# Patient Record
Sex: MALE | Race: WHITE | NOT HISPANIC OR LATINO | Employment: OTHER | URBAN - METROPOLITAN AREA
[De-identification: names, ages, dates, MRNs, and addresses within clinical notes are randomized per-mention and may not be internally consistent; named-entity substitution may affect disease eponyms.]

---

## 2017-11-06 ENCOUNTER — ALLSCRIPTS OFFICE VISIT (OUTPATIENT)
Dept: OTHER | Facility: OTHER | Age: 72
End: 2017-11-06

## 2017-11-06 LAB
BILIRUB UR QL STRIP: NORMAL
CLARITY UR: NORMAL
COLOR UR: YELLOW
GLUCOSE (HISTORICAL): NORMAL
HGB UR QL STRIP.AUTO: NORMAL
KETONES UR STRIP-MCNC: NORMAL MG/DL
LEUKOCYTE ESTERASE UR QL STRIP: NORMAL
NITRITE UR QL STRIP: NORMAL
PH UR STRIP.AUTO: 6 [PH]
PROT UR STRIP-MCNC: NORMAL MG/DL
SP GR UR STRIP.AUTO: 1.02
UROBILINOGEN UR QL STRIP.AUTO: 0.2

## 2018-01-14 VITALS
WEIGHT: 268 LBS | SYSTOLIC BLOOD PRESSURE: 126 MMHG | BODY MASS INDEX: 43.07 KG/M2 | DIASTOLIC BLOOD PRESSURE: 72 MMHG | HEIGHT: 66 IN

## 2018-04-24 DIAGNOSIS — N32.81 OVERACTIVE BLADDER: Primary | ICD-10-CM

## 2018-04-24 NOTE — TELEPHONE ENCOUNTER
Patient called requesting refill on Enablex 15mg    Request for same, 90 day supply with 2 refills was queued and forwarded to Dr Guilherme Brown for approval

## 2018-04-25 RX ORDER — DARIFENACIN HYDROBROMIDE 15 MG/1
15 TABLET, EXTENDED RELEASE ORAL DAILY
Qty: 90 TABLET | Refills: 2 | Status: SHIPPED | OUTPATIENT
Start: 2018-04-25 | End: 2019-01-25 | Stop reason: SDUPTHER

## 2018-05-06 DIAGNOSIS — R97.20 ELEVATED PROSTATE SPECIFIC ANTIGEN (PSA): ICD-10-CM

## 2018-05-07 RX ORDER — WARFARIN SODIUM 10 MG/1
TABLET ORAL
COMMUNITY

## 2018-05-07 RX ORDER — NIACIN 500 MG/1
TABLET, EXTENDED RELEASE ORAL
COMMUNITY

## 2018-05-07 RX ORDER — ATORVASTATIN CALCIUM 20 MG/1
20 TABLET, FILM COATED ORAL
COMMUNITY
Start: 2018-01-09

## 2018-05-07 RX ORDER — MULTIVIT-MIN/FOLIC ACID/LUTEIN 500-250MCG
TABLET,CHEWABLE ORAL
COMMUNITY

## 2018-05-07 RX ORDER — PREDNISONE 10 MG/1
TABLET ORAL
COMMUNITY
Start: 2018-04-19

## 2018-05-07 RX ORDER — AMOXICILLIN 500 MG/1
CAPSULE ORAL
COMMUNITY
Start: 2018-04-02

## 2018-05-07 RX ORDER — CALCITRIOL 0.25 UG/1
CAPSULE, LIQUID FILLED ORAL
COMMUNITY
Start: 2018-03-07

## 2018-05-07 RX ORDER — LEVOTHYROXINE SODIUM 175 UG/1
TABLET ORAL
COMMUNITY
Start: 2017-11-27

## 2018-05-09 ENCOUNTER — OFFICE VISIT (OUTPATIENT)
Dept: UROLOGY | Facility: MEDICAL CENTER | Age: 73
End: 2018-05-09
Payer: MEDICARE

## 2018-05-09 VITALS
HEIGHT: 66 IN | SYSTOLIC BLOOD PRESSURE: 132 MMHG | WEIGHT: 275 LBS | DIASTOLIC BLOOD PRESSURE: 70 MMHG | BODY MASS INDEX: 44.2 KG/M2

## 2018-05-09 DIAGNOSIS — N31.1 REFLEX NEUROPATHIC BLADDER, NOT ELSEWHERE CLASSIFIED: ICD-10-CM

## 2018-05-09 DIAGNOSIS — N13.8 BPH WITH OBSTRUCTION/LOWER URINARY TRACT SYMPTOMS: Primary | ICD-10-CM

## 2018-05-09 DIAGNOSIS — N40.1 BPH WITH OBSTRUCTION/LOWER URINARY TRACT SYMPTOMS: Primary | ICD-10-CM

## 2018-05-09 DIAGNOSIS — R97.20 ELEVATED PSA: ICD-10-CM

## 2018-05-09 PROBLEM — I51.9 HEART DISEASE: Status: ACTIVE | Noted: 2017-10-31

## 2018-05-09 PROBLEM — E66.01 MORBID OBESITY (HCC): Status: ACTIVE | Noted: 2017-05-09

## 2018-05-09 LAB
SL AMB  POCT GLUCOSE, UA: NORMAL
SL AMB LEUKOCYTE ESTERASE,UA: NORMAL
SL AMB POCT BILIRUBIN,UA: NORMAL
SL AMB POCT BLOOD,UA: NORMAL
SL AMB POCT CLARITY,UA: CLEAR
SL AMB POCT COLOR,UA: YELLOW
SL AMB POCT KETONES,UA: NORMAL
SL AMB POCT NITRITE,UA: NORMAL
SL AMB POCT PH,UA: 6
SL AMB POCT SPECIFIC GRAVITY,UA: 1.02
SL AMB POCT URINE PROTEIN: NORMAL
SL AMB POCT UROBILINOGEN: 0.2

## 2018-05-09 PROCEDURE — 99214 OFFICE O/P EST MOD 30 MIN: CPT | Performed by: UROLOGY

## 2018-05-09 PROCEDURE — 81003 URINALYSIS AUTO W/O SCOPE: CPT | Performed by: UROLOGY

## 2018-05-09 NOTE — PROGRESS NOTES
Assessment/Plan:    Elevated PSA  PSA is stable at 4 69 (March 28, 2018) he had a biopsy in the past     Reflex neuropathic bladder, not elsewhere classified  Stable on Enablex 15 mg daily  Renal ultrasound did not reveal any mass or hydronephrosis last year  Continue present therapy  BPH with obstruction/lower urinary tract symptoms  AUA symptom score is 23  Urinalysis is negative  He is satisfied with his voiding pattern on Enablex  We will continue to follow  Diagnoses and all orders for this visit:    BPH with obstruction/lower urinary tract symptoms  -     POCT urine dip auto non-scope    Elevated PSA  -     PSA, total and free; Future    Reflex neuropathic bladder, not elsewhere classified    Other orders  -     amoxicillin (AMOXIL) 500 mg capsule; 4 cap 1 hr before dental procedure  -     aspirin 81 MG tablet; Take by mouth  -     atorvastatin (LIPITOR) 20 mg tablet; Take 20 mg by mouth  -     calcitriol (ROCALTROL) 0 25 mcg capsule; 1 tab 2 x aday  -     Calcium Carbonate-Vit D-Min (CALCIUM 600+D PLUS MINERALS) 600-400 MG-UNIT CHEW; Chew  -     warfarin (COUMADIN) 10 mg tablet; Take by mouth  -     Benzocaine-Docusate Sodium (ENEMEEZ PLUS)  MG ENEM; INSERT 1 RECTALLY DAILY  -     levothyroxine 175 mcg tablet; Take 1 tab by mouth 6days per week  -     Magnesium Oxide -Mg Supplement 400 MG CAPS; Take by mouth  -     metoprolol tartrate (LOPRESSOR) 25 mg tablet; Take 25 mg by mouth  -     niacin (NIASPAN) 500 mg CR tablet; Take by mouth  -     OMEGA-3 FATTY ACIDS PO; Take by mouth  -     predniSONE 10 mg tablet; Take 1 tablet 3 times daily for 5 days then 1 tabletTwice daily for 5 days then 1 tablet daily for 5 days          Subjective:      Patient ID: Suzanne Arroyo is a 67 y o  male  60-year-old male followed for lower tract symptoms secondary to BPH and a neurogenic bladder as well as an elevated PSA  He reports he is voiding adequately  His stream is slow and intermittent    He does feel he empties  He has occasional urgency and urge incontinence  He continues to take Enablex and feels the medication definitely does help  He get he gets up once a night to urinate  There is no gross hematuria, dysuria or symptoms of infection  He has no flank pain  There is no history of urinary tract infection  The following portions of the patient's history were reviewed and updated as appropriate: allergies, current medications, past family history, past medical history, past social history, past surgical history and problem list     Review of Systems   Constitutional: Negative for chills, diaphoresis, fatigue and fever  HENT: Negative  Eyes: Negative  Respiratory: Negative  Cardiovascular: Negative  Gastrointestinal: Negative  Endocrine: Negative  Musculoskeletal: Positive for gait problem  Skin: Negative  Allergic/Immunologic: Negative  Neurological: Positive for numbness  Hematological: Negative  Psychiatric/Behavioral: Negative  Objective:      /70 (BP Location: Left arm, Patient Position: Sitting)   Ht 5' 6" (1 676 m)   Wt 125 kg (275 lb)   BMI 44 39 kg/m²          Physical Exam   Constitutional: He is oriented to person, place, and time  He appears well-developed and well-nourished  HENT:   Head: Normocephalic and atraumatic  Eyes: Conjunctivae are normal    Neck: Neck supple  Cardiovascular: Normal rate  Pulmonary/Chest: Effort normal    Abdominal: Soft  Bowel sounds are normal  He exhibits no distension and no mass  There is no tenderness  There is no rebound, no guarding and no CVA tenderness  Hernia confirmed negative in the right inguinal area and confirmed negative in the left inguinal area  Genitourinary: Rectum normal, testes normal and penis normal  Prostate is enlarged  Prostate is not tender  Right testis shows no mass  Left testis shows no mass  No phimosis or hypospadias     Genitourinary Comments: Decreased tone  Prostate 1 1/2+ and palpably benign  Musculoskeletal: He exhibits no edema  Neurological: He is alert and oriented to person, place, and time  Skin: Skin is warm and dry  Psychiatric: He has a normal mood and affect   His behavior is normal  Judgment and thought content normal

## 2018-05-09 NOTE — PATIENT INSTRUCTIONS
Prostate Specific Antigen   AMBULATORY CARE:   A prostate specific antigen (PSA) test  is a blood test used to screen men for prostate cancer  A PSA test is also used to monitor how well prostate cancer treatment is working  Other test that may be done with a PSA test:  A digital rectal exam is usually performed with a PSA test  Your healthcare provider will insert a gloved finger into your rectum to feel if your prostate is large, firm, or has lumps  Who may need a PSA test:  Some experts recommend a PSA test for men ages 48 to 79  They also recommend testing men with a high risk for prostate cancer at age 36 or 39  Risk factors may include being  or having a brother or father with prostate cancer  Other experts may not recommend PSA testing  Your healthcare provider can help you decide if you need a PSA test    What the results of a PSA test mean:  Most healthy men have a PSA level less than 4 ng/mL  If your PSA level is higher than 4 ng/mL you may need more tests  Examples include blood or urine tests, an ultrasound, MRI, CT scan, or a prostate biopsy  Ask your healthcare provider for more information on these tests  Other causes of a high PSA level:  A high PSA level does not always mean you have prostate cancer  Certain conditions or procedures can increase PSA levels  Examples include:  · Older age    · Procedures such as a prostate biopsy or a cystoscopy    · An enlarged prostate    · Recent sexual activity    · A prostate infection    · Certain exercises that put pressure on the prostate such as bicycling    · Medicine such as testosterone  © 2017 [address] Information is for End User's use only and may not be sold, redistributed or otherwise used for commercial purposes  All illustrations and images included in CareNotes® are the copyrighted property of [employer]  or [employer] D[employer]  The above information is an  only   It is not intended as medical advice for individual conditions or treatments  Talk to your doctor, nurse or pharmacist before following any medical regimen to see if it is safe and effective for you

## 2018-05-09 NOTE — PROGRESS NOTES
IPSS Questionnaire (AUA-7): Over the past month    1)  How often have you had a sensation of not emptying your bladder completely after you finish urinating? 1 - Less than 1 time in 5   2)  How often have you had to urinate again less than two hours after you finished urinating? 2 - Less than half the time   3)  How often have you found you stopped and started again several times when you urinated? 5 - Almost always   4) How difficult have you found it to postpone urination? 4 - More than half the time   5) How often have you had a weak urinary stream?  5 - Almost always   6) How often have you had to push or strain to begin urination? 5 - Almost always   7) How many times did you most typically get up to urinate from the time you went to bed until the time you got up in the morning?   1 - 1 time   Total Score:  23

## 2018-05-09 NOTE — ASSESSMENT & PLAN NOTE
AUA symptom score is 23  Urinalysis is negative  He is satisfied with his voiding pattern on Enablex  We will continue to follow

## 2018-05-09 NOTE — ASSESSMENT & PLAN NOTE
Stable on Enablex 15 mg daily  Renal ultrasound did not reveal any mass or hydronephrosis last year  Continue present therapy

## 2018-05-09 NOTE — LETTER
May 9, 2018     Owen Duran, [address] [address]    Patient: Johann Terrazas   YOB: 1945   Date of Visit: 5/9/2018       Dear Dr Ray Quintana: Thank you for referring Jerilyn Perla to me for evaluation  Below are my notes for this consultation  If you have questions, please do not hesitate to call me  I look forward to following your patient along with you  Sincerely,        Chris Vyas MD        CC: No Recipients  Chris Vyas MD  5/9/2018  1:33 PM  Incomplete  Assessment/Plan:    No problem-specific Assessment & Plan notes found for this encounter  Diagnoses and all orders for this visit:    BPH with obstruction/lower urinary tract symptoms  -     POCT urine dip auto non-scope    Elevated PSA    Other orders  -     amoxicillin (AMOXIL) 500 mg capsule; 4 cap 1 hr before dental procedure  -     aspirin 81 MG tablet; Take by mouth  -     atorvastatin (LIPITOR) 20 mg tablet; Take 20 mg by mouth  -     calcitriol (ROCALTROL) 0 25 mcg capsule; 1 tab 2 x aday  -     Calcium Carbonate-Vit D-Min (CALCIUM 600+D PLUS MINERALS) 600-400 MG-UNIT CHEW; Chew  -     warfarin (COUMADIN) 10 mg tablet; Take by mouth  -     Benzocaine-Docusate Sodium (ENEMEEZ PLUS)  MG ENEM; INSERT 1 RECTALLY DAILY  -     levothyroxine 175 mcg tablet; Take 1 tab by mouth 6days per week  -     Magnesium Oxide -Mg Supplement 400 MG CAPS; Take by mouth  -     metoprolol tartrate (LOPRESSOR) 25 mg tablet; Take 25 mg by mouth  -     niacin (NIASPAN) 500 mg CR tablet; Take by mouth  -     OMEGA-3 FATTY ACIDS PO; Take by mouth  -     predniSONE 10 mg tablet; Take 1 tablet 3 times daily for 5 days then 1 tabletTwice daily for 5 days then 1 tablet daily for 5 days          Subjective:      Patient ID: Johann Terrazas is a 67 y o  male      HPI    The following portions of the patient's history were reviewed and updated as appropriate: allergies, current medications, past family history, past medical history, past social history, past surgical history and problem list     Review of Systems   Constitutional: Negative for chills, diaphoresis, fatigue and fever  HENT: Negative  Eyes: Negative  Respiratory: Negative  Cardiovascular: Negative  Gastrointestinal: Negative  Endocrine: Negative  Musculoskeletal: Positive for gait problem  Skin: Negative  Allergic/Immunologic: Negative  Neurological: Positive for numbness  Hematological: Negative  Psychiatric/Behavioral: Negative  Objective:      /70 (BP Location: Left arm, Patient Position: Sitting)   Ht 5' 6" (1 676 m)   Wt 125 kg (275 lb)   BMI 44 39 kg/m²           Physical Exam   Constitutional: He is oriented to person, place, and time  He appears well-developed and well-nourished  HENT:   Head: Normocephalic and atraumatic  Eyes: Conjunctivae are normal    Neck: Neck supple  Cardiovascular: Normal rate  Pulmonary/Chest: Effort normal    Abdominal: Soft  Bowel sounds are normal  He exhibits no distension and no mass  There is no tenderness  There is no rebound, no guarding and no CVA tenderness  Hernia confirmed negative in the right inguinal area and confirmed negative in the left inguinal area  Genitourinary: Rectum normal, testes normal and penis normal  Prostate is enlarged  Prostate is not tender  Right testis shows no mass  Left testis shows no mass  No phimosis or hypospadias  Genitourinary Comments: Decreased tone  Prostate 1 1/2+ and palpably benign  Musculoskeletal: He exhibits no edema  Neurological: He is alert and oriented to person, place, and time  Skin: Skin is warm and dry  Psychiatric: He has a normal mood and affect   His behavior is normal  Judgment and thought content normal

## 2019-01-23 DIAGNOSIS — N32.81 OVERACTIVE BLADDER: ICD-10-CM

## 2019-01-23 NOTE — TELEPHONE ENCOUNTER
Patient left message for a refill on medication Darifenacin 15 mg called into his [address] in [place] on [address]  Patient is looking for a 90 day supply   Pharmacy number [phone]

## 2019-01-25 RX ORDER — DARIFENACIN HYDROBROMIDE 15 MG/1
15 TABLET, EXTENDED RELEASE ORAL DAILY
Qty: 90 TABLET | Refills: 2 | Status: SHIPPED | OUTPATIENT
Start: 2019-01-25

## 2019-01-25 NOTE — TELEPHONE ENCOUNTER
Patient called requesting refill on Enablex 15mg    Request for same, 90 day supply with 2 refills was queued and forwarded to JADE Hernandez for approval

## 2019-06-05 ENCOUNTER — TELEPHONE (OUTPATIENT)
Dept: UROLOGY | Facility: MEDICAL CENTER | Age: 74
End: 2019-06-05

## 2019-06-05 DIAGNOSIS — R97.20 ELEVATED PROSTATE SPECIFIC ANTIGEN (PSA): Primary | ICD-10-CM

## 2019-06-18 ENCOUNTER — OFFICE VISIT (OUTPATIENT)
Dept: UROLOGY | Facility: MEDICAL CENTER | Age: 74
End: 2019-06-18
Payer: MEDICARE

## 2019-06-18 VITALS
WEIGHT: 268 LBS | SYSTOLIC BLOOD PRESSURE: 130 MMHG | HEIGHT: 65 IN | HEART RATE: 66 BPM | BODY MASS INDEX: 44.65 KG/M2 | DIASTOLIC BLOOD PRESSURE: 78 MMHG

## 2019-06-18 DIAGNOSIS — N40.1 BPH WITH OBSTRUCTION/LOWER URINARY TRACT SYMPTOMS: ICD-10-CM

## 2019-06-18 DIAGNOSIS — N31.1 REFLEX NEUROPATHIC BLADDER, NOT ELSEWHERE CLASSIFIED: ICD-10-CM

## 2019-06-18 DIAGNOSIS — R97.20 ELEVATED PROSTATE SPECIFIC ANTIGEN (PSA): Primary | ICD-10-CM

## 2019-06-18 DIAGNOSIS — N13.8 BPH WITH OBSTRUCTION/LOWER URINARY TRACT SYMPTOMS: ICD-10-CM

## 2019-06-18 LAB
SL AMB  POCT GLUCOSE, UA: ABNORMAL
SL AMB LEUKOCYTE ESTERASE,UA: ABNORMAL
SL AMB POCT BILIRUBIN,UA: ABNORMAL
SL AMB POCT BLOOD,UA: ABNORMAL
SL AMB POCT CLARITY,UA: CLEAR
SL AMB POCT COLOR,UA: YELLOW
SL AMB POCT KETONES,UA: ABNORMAL
SL AMB POCT NITRITE,UA: ABNORMAL
SL AMB POCT PH,UA: 6
SL AMB POCT SPECIFIC GRAVITY,UA: 1.02
SL AMB POCT URINE PROTEIN: ABNORMAL
SL AMB POCT UROBILINOGEN: 0.2

## 2019-06-18 PROCEDURE — 81003 URINALYSIS AUTO W/O SCOPE: CPT | Performed by: UROLOGY

## 2019-06-18 PROCEDURE — 99214 OFFICE O/P EST MOD 30 MIN: CPT | Performed by: UROLOGY

## 2019-06-18 RX ORDER — SENNA PLUS 8.6 MG/1
TABLET ORAL
COMMUNITY
Start: 2011-12-15

## 2019-06-18 RX ORDER — METFORMIN HYDROCHLORIDE 500 MG/1
500 TABLET, EXTENDED RELEASE ORAL
COMMUNITY
Start: 2019-05-28 | End: 2020-05-27

## 2019-07-15 ENCOUNTER — HOSPITAL ENCOUNTER (OUTPATIENT)
Dept: RADIOLOGY | Facility: HOSPITAL | Age: 74
Discharge: HOME | End: 2019-07-15
Attending: UROLOGY
Payer: MEDICARE

## 2019-07-15 ENCOUNTER — TRANSCRIBE ORDERS (OUTPATIENT)
Dept: RADIOLOGY | Facility: HOSPITAL | Age: 74
End: 2019-07-15

## 2019-07-15 DIAGNOSIS — R97.20 ELEVATED PROSTATE SPECIFIC ANTIGEN (PSA): ICD-10-CM

## 2019-07-15 PROCEDURE — 70030 X-RAY EYE FOR FOREIGN BODY: CPT

## 2019-07-15 PROCEDURE — 72195 MRI PELVIS W/O DYE: CPT

## 2019-07-23 ENCOUNTER — TELEPHONE (OUTPATIENT)
Dept: UROLOGY | Facility: AMBULATORY SURGERY CENTER | Age: 74
End: 2019-07-23

## 2019-07-24 ENCOUNTER — TELEPHONE (OUTPATIENT)
Dept: UROLOGY | Facility: MEDICAL CENTER | Age: 74
End: 2019-07-24

## 2019-07-24 NOTE — TELEPHONE ENCOUNTER
----- Message from Ron Silveira MD sent at 7/23/2019  3:50 PM EDT -----  Please ask the patient to make an appointment with me or the [facility] to discuss his mri

## 2019-07-29 ENCOUNTER — OFFICE VISIT (OUTPATIENT)
Dept: UROLOGY | Facility: MEDICAL CENTER | Age: 74
End: 2019-07-29
Payer: MEDICARE

## 2019-07-29 VITALS
HEART RATE: 66 BPM | DIASTOLIC BLOOD PRESSURE: 70 MMHG | WEIGHT: 268 LBS | BODY MASS INDEX: 44.65 KG/M2 | SYSTOLIC BLOOD PRESSURE: 124 MMHG | HEIGHT: 65 IN

## 2019-07-29 DIAGNOSIS — N40.1 BPH WITH OBSTRUCTION/LOWER URINARY TRACT SYMPTOMS: Primary | ICD-10-CM

## 2019-07-29 DIAGNOSIS — N13.8 BPH WITH OBSTRUCTION/LOWER URINARY TRACT SYMPTOMS: Primary | ICD-10-CM

## 2019-07-29 DIAGNOSIS — R97.20 ELEVATED PROSTATE SPECIFIC ANTIGEN (PSA): ICD-10-CM

## 2019-07-29 PROCEDURE — 99214 OFFICE O/P EST MOD 30 MIN: CPT | Performed by: UROLOGY

## 2019-07-29 NOTE — PATIENT INSTRUCTIONS
Prostate Biopsy   WHAT YOU NEED TO KNOW:   What do I need to know about a prostate biopsy? A prostate biopsy is a procedure to remove samples of tissue from your prostate gland  The prostate is a male sex gland that makes fluid found in semen  It is located just below the bladder  After the samples are removed, they are sent to a lab and tested for cancer  How do I prepare for a prostate biopsy? · Your healthcare provider will talk to you about how to prepare for this procedure  He may tell you not to eat or drink anything after midnight on the day of your surgery  You will need to stop taking blood thinners several days before your procedure  Examples of blood thinners include warfarin and NSAIDs  You may also need to stop taking herbal supplements before your procedure  Your healthcare provider will tell you what other medicines to take or not take on the day of your procedure  · You will be given an antibiotic to help prevent a bacterial infection  You may need to give yourself an enema (liquid medicine put in your rectum) to help empty your bowel before your procedure  What will happen during a prostate biopsy? · You may need to lie on your side with your knees pulled up toward your chest  Numbing cream or gel may be put into your rectum, or numbing medicine may be injected near your prostate  You may instead be given general anesthesia to keep you asleep and free from pain during the procedure  A biopsy sample may be taken through your rectum, urethra, or perineum  The perineum is the area between your scrotum and rectum  Most of the time, biopsy samples are taken through the rectum  · If your healthcare provider takes a sample through your rectum, he will insert a small ultrasound probe into your rectum  The ultrasound shows pictures of your prostate on a monitor, and is used to help guide the biopsy needle   Your healthcare provider will push the biopsy needle through the wall of your rectum and into your prostate gland  Your healthcare provider will remove between 6 to 12 samples of tissue from different areas of your prostate gland  The samples will be sent to a lab and tested for cancer  What will happen after a prostate biopsy? You may feel soreness at the biopsy site  You may need to take antibiotics for up to 2 days after your procedure to help prevent an infection  You may have some bleeding from your rectum  You may also have blood in your urine, bowel movements, or semen  What are the risks of a prostate biopsy? You may bleed more than expected or get an infection in your urinary tract or prostate gland  The infection may spread to your blood and the rest of your body  Your bladder may not empty completely when you urinate  You may need a catheter to help empty your bladder for a short period of time  Cancer cells may be missed during your biopsy procedure  You may need another prostate biopsy to check for cancer again  CARE AGREEMENT:   You have the right to help plan your care  Learn about your health condition and how it may be treated  Discuss treatment options with your caregivers to decide what care you want to receive  You always have the right to refuse treatment  The above information is an  only  It is not intended as medical advice for individual conditions or treatments  Talk to your doctor, nurse or pharmacist before following any medical regimen to see if it is safe and effective for you  © 2017 [facility] Information is for End User's use only and may not be sold, redistributed or otherwise used for commercial purposes  All illustrations and images included in CareNotes® are the copyrighted property of [employer]  or Randolph Dao

## 2019-07-29 NOTE — PROGRESS NOTES
Chief complaint:  Elevated PSA    HPI:  22-year-old male followed for an elevated PSA  Most recent PSA has risen to 7 03  Free PSA was 17%  The patient has had a prior negative biopsy  At his last visit we discussed the pros and cons of proceeding with further evaluation and he elected to have a prostate MRI  The MRI reveals a 70 g prostate  There is a 0 8 cm PIRADS 4 lesion in the left posterior peripheral zone  The patient returns today for follow-up and to discuss the findings on MRI  He reports that he had an episode of chest discomfort during the MRI  The patient felt that this was musculoskeletal and it did resolve after approximately 48 hours  The patient is presently feeling well  He has not had any change in his voiding pattern  There is no gross hematuria or symptoms of infection  The patient reports that he has been noted to have blood in his stool and he will be seeing a gastroenterologist next week  He remains on Coumadin  I had a long discussion with the patient regarding the PIRADS for lesion on MRI  The risk of prostate cancer was discussed  The patient has multiple competing comorbidities  He was interested in pursuing fusion prostate biopsy of the lesion  The procedure was described and risks discussed  Prior to proceeding with biopsy  I did recommend he discuss the risk of discontinuing his Coumadin with his cardiologist   I also felt he should inform the cardiologist of his chest discomfort even though the patient feels this was musculoskeletal or related to the gadolinium infused during the MRI  At the conclusion were discussion the patient did wish to proceed with biopsy  We will plan to schedule this after he sees gastroenterology  He will also contact the heart doctor  25 minutes was spent in discussion regarding the above

## 2019-07-29 NOTE — LETTER
July 29, 2019     Anita Barrios, [address] [address] [address] [facility] [address]    Patient: Viktor Kelley   YOB: 1945   Date of Visit: 7/29/2019       Dear Dr Ivan Llanos: Thank you for referring Latosha Encarnacion to me for evaluation  Below are my notes for this consultation  If you have questions, please do not hesitate to call me  I look forward to following your patient along with you  Sincerely,        Neisha Meng MD        CC: DO Neisha Lugo MD  7/29/2019 10:14 AM  Sign at close encounter  Chief complaint:  Elevated PSA    HPI:  29-year-old male followed for an elevated PSA  Most recent PSA has risen to 7 03  Free PSA was 17%  The patient has had a prior negative biopsy  At his last visit we discussed the pros and cons of proceeding with further evaluation and he elected to have a prostate MRI  The MRI reveals a 70 g prostate  There is a 0 8 cm PIRADS 4 lesion in the left posterior peripheral zone  The patient returns today for follow-up and to discuss the findings on MRI  He reports that he had an episode of chest discomfort during the MRI  The patient felt that this was musculoskeletal and it did resolve after approximately 48 hours  The patient is presently feeling well  He has not had any change in his voiding pattern  There is no gross hematuria or symptoms of infection  The patient reports that he has been noted to have blood in his stool and he will be seeing a gastroenterologist next week  He remains on Coumadin  I had a long discussion with the patient regarding the PIRADS for lesion on MRI  The risk of prostate cancer was discussed  The patient has multiple competing comorbidities  He was interested in pursuing fusion prostate biopsy of the lesion  The procedure was described and risks discussed    Prior to proceeding with biopsy  I did recommend he discuss the risk of discontinuing his Coumadin with his cardiologist   I also felt he should inform the cardiologist of his chest discomfort even though the patient feels this was musculoskeletal or related to the gadolinium infused during the MRI  At the conclusion were discussion the patient did wish to proceed with biopsy  We will plan to schedule this after he sees gastroenterology  He will also contact the heart doctor  25 minutes was spent in discussion regarding the above

## 2019-08-06 ENCOUNTER — TELEPHONE (OUTPATIENT)
Dept: UROLOGY | Facility: MEDICAL CENTER | Age: 74
End: 2019-08-06

## 2019-08-06 NOTE — TELEPHONE ENCOUNTER
I spoke to patient and scheduled his MRI Fusion Prostate Biopsy  Please send in cipro and sedation  medication to local pharmacy

## 2019-08-08 DIAGNOSIS — R97.20 ELEVATED PROSTATE SPECIFIC ANTIGEN (PSA): Primary | ICD-10-CM

## 2019-08-08 RX ORDER — CIPROFLOXACIN 500 MG/1
500 TABLET, FILM COATED ORAL EVERY 12 HOURS SCHEDULED
Qty: 2 TABLET | Refills: 0 | Status: SHIPPED | OUTPATIENT
Start: 2019-08-08 | End: 2019-08-09

## 2019-08-08 RX ORDER — LORAZEPAM 1 MG/1
TABLET ORAL
Qty: 1 TABLET | Refills: 0 | Status: SHIPPED | OUTPATIENT
Start: 2019-08-08

## 2019-08-14 NOTE — TELEPHONE ENCOUNTER
Patient calling to discuss biopsy  Has questions regarding schedule and medication      He can be reached at [phone]

## 2019-08-14 NOTE — TELEPHONE ENCOUNTER
Spoke to pt  He is scheduled for Fusion Biopsy on 10/15  States he is trying to coordinate this procedure with colonoscopy  Pt is on Coumadin and needs to be bridged  States Dr Darcy Ball was to speak with his Cardiologist Dr Justin Pena  Will direct to Dr Darcy Ball and Surgery Scheduler re: issue

## 2019-08-19 NOTE — TELEPHONE ENCOUNTER
I spoke to Dr Mustapha Haddad office  They said that since Dr Deedee Helton prescribed the Coumadin the patient would have to talk to Dr Deedee Helton about whether the medication could be held or if he should have a Lovenox bridge  Please ask Mr  Current to call Dr Deedee Helton would then be the physician to order any needed medication

## 2019-10-15 ENCOUNTER — TELEPHONE (OUTPATIENT)
Dept: UROLOGY | Facility: MEDICAL CENTER | Age: 74
End: 2019-10-15

## 2019-10-15 NOTE — TELEPHONE ENCOUNTER
Received signed authorization from the patient dated ? 10/11/2019 to release information to [address] [place], [place] [address]y   Per patient request, Medical Records were faxed to [address] [place] [address] Urology  on 10/15/2019

## 2019-10-29 NOTE — TELEPHONE ENCOUNTER
Final result in Epic    Please review MA/nurse's notes reviewed.    Subjective:  Patient is here today for cold symptoms that started yesterday morning.  He has been having nasal congestion and rhinorrhea, itchy nose, itchy watery eyes, burning sensation of the nose.  He states that he is coughing.  He has a history of asthma but is not noticing any symptoms shortness of breath or wheezing.  He uses Advair Diskus twice daily and albuterol just as needed.  He has not felt like he needed his albuterol recently.  He has Singulair 10 mg a day uses nightly.  He has allergic rhinitis and uses fluticasone nasal spray 2 sprays each nostril twice daily.  His current prescriptions says to use this once daily but he states that he is using it twice a day.  He does not use other antihistamines.  He states that he was having a difficult time sleeping last night because he could not use his CPAP mask because of his congestion.  He states that is rhinorrhea is purulent.  Eyes are itchy, watery, and red.  He uses Visine but this does not help his symptoms much.  He denies fevers or chills.  He states have been other family members in the house who are currently ill.  Patient is diabetic and has hypertension.  He is requesting a new freestyle meter because he had accidentally stepped on his and broke.    Current Outpatient Medications   Medication Sig Dispense Refill   • hydrochlorothiazide (HYDRODIURIL) 25 MG tablet Take 1 tablet by mouth daily. 30 tablet 5   • atorvastatin (LIPITOR) 20 MG tablet Take 1 tablet by mouth daily. 60 tablet 5   • [START ON 11/26/2019] HYDROcodone-acetaminophen (NORCO)  MG per tablet Take 1 tablet by mouth every 4 hours as needed for Pain. Do not start before November 26, 2019. Max 5 tabs daily 150 tablet 0   • HYDROcodone-acetaminophen (NORCO)  MG per tablet Take 1 tablet by mouth every 4 to 6 hours as needed for Pain. Do not start before October 27, 2019. Max 5 daily 150 tablet 0   • metFORMIN (GLUCOPHAGE) 500 MG tablet TAKE 1  TABLET BY MOUTH TWICE DAILY WITH MEALS 60 tablet 2   • lisinopril (ZESTRIL) 20 MG tablet Take 1 tablet by mouth 2 times daily. 60 tablet 5   • ADVAIR DISKUS 250-50 MCG/DOSE inhaler INHALE 1 PUFF INTO THE LUNGS TWICE DAILY 60 each 11   • DULoxetine (CYMBALTA) 60 MG capsule Take 1 capsule by mouth daily. 30 capsule 5   • gabapentin (NEURONTIN) 600 MG tablet Take 2 tablets by mouth 2 times daily. 120 tablet 5   • montelukast (SINGULAIR) 10 MG tablet Take 1 tablet by mouth nightly. 90 tablet 1   • NALOXONE KIT - FOR SUSPECTED OPIOID OVERDOSE Call 911. Naloxone Nasal Spray 4mg/0.1ml Use one spray (0.1ml) in one nostril if needed. After 2-3 minutes repeat dose in opposite nostril. 1 kit 0   • zolpidem (AMBIEN) 10 MG tablet Take 1 tablet by mouth nightly as needed for Sleep. 30 tablet 2   • albuterol (PROAIR HFA) 108 (90 Base) MCG/ACT inhaler Inhale 1 puff into the lungs every 4 hours as needed for Shortness of Breath or Wheezing. 1 Inhaler 11   • fluticasone (FLONASE) 50 MCG/ACT nasal spray Spray 2 sprays in each nostril daily. SHAKE WELL 1 Bottle 11   • FREESTYLE TEST STRIPS test strip TEST BLOOD SUGAR ONE TIME DAILY 100 strip 0   • albuterol-ipratropium 2.5 mg/0.5 mg (DUONEB) 0.5-2.5 (3) MG/3ML nebulizer solution USE 3 ML VIA NEBULIZER EVERY 6 HOURS AS NEEDED FOR WHEEZING 360 mL 0   • diclofenac (VOLTAREN) 1 % gel Apply 2 g topically 3 times daily as needed (pain). Apply to the left ankle 3 times a day as needed. 3 Tube 2   • Cholecalciferol 5000 UNITS Tab      • Omega-3 Fatty Acids (FISH OIL) 1000 MG capsule Take 1 g by mouth daily. 30 capsule 5   • diphenhydrAMINE (BENADRYL) 25 MG capsule Take 1 capsule by mouth every 6 hours as needed for Itching. 30 capsule 0   • aspirin 81 MG tablet Take 1 tablet by mouth daily.     • fexofenadine (ALLEGRA) 180 MG tablet Take 1 tablet by mouth daily. 30 tablet 5   • azelastine (ASTELIN) 0.1 % nasal spray Spray 2 sprays in each nostril 2 times daily. Use in each nostril as directed  30 mL 12   • olopatadine (PATANOL) 0.1 % ophthalmic solution Place 1 drop into both eyes 2 times daily. 5 mL 5   • glucose monitoring kit (FREESTYLE) monitoring kit Please check blood sugar once daily/and when symptomatic 1 each PRN   • Lancets (FREESTYLE) Misc Use lancet to test blood sugar once daily and when symptomatic 100 each 5     No current facility-administered medications for this visit.      ALLERGIES:  No Known Allergies    Past Medical History:   Diagnosis Date   • Allergic rhinitis 2/20/2019   • Asthma     Mild restriction on PFT, Pulmonologist Dr Muir   • Back pain 4/25/2013   • Benign localized hyperplasia of prostate without urinary obstruction and other lower urinary tract symptoms (LUTS) 4/22/2014   • Benign paroxysmal positional vertigo    • Bipolar disorder (CMS/HCC)    • Colon polyps    • Diabetes mellitus (CMS/HCC)    • Difficulty in walking(719.7)    • Diverticular disease    • Eczema    • HTN (hypertension)    • Hyperlipidemia    • Onychocryptosis    • SILVANA on CPAP 3/19/2013   • Schatzki's ring      Past Surgical History:   Procedure Laterality Date   • Colonoscopy w/ polypectomy  05/06/2010   • Esophagogastroduodenoscopy transoral flex w/bx single or mult  05/06/2010   • Eye surgery     • Orif tibia & fibula fractures       Social History     Tobacco Use   • Smoking status: Current Every Day Smoker     Packs/day: 0.50     Years: 35.00     Pack years: 17.50     Types: Cigarettes   • Smokeless tobacco: Never Used   Substance Use Topics   • Alcohol use: Yes     Alcohol/week: 0.8 standard drinks     Types: 1 Standard drinks or equivalent per week   • Drug use: No     Objective:  Patient is alert and oriented, no acute distress, non-ill-appearing.  Visit Vitals  /80   Pulse 97   Temp 98.6 °F (37 °C) (Oral)   Ht 6' 1\" (1.854 m)   Wt (!) 140.2 kg   SpO2 96%   BMI 40.77 kg/m²     HEENT:  Normocephalic, atraumatic.  Ears-TMs without erythema, bulging, retraction.  Canals without cerumen or  drainage.  Sinuses-nontender to palpation or percussion.  Nose- clear rhinorrhea, positive for swelling of the turbinates, septum midline.  Oropharynx-mucous membranes moist, posterior oropharynx without erythema or exudates, teeth in good repair.  Lips are not dry or cracked.  Eyes-conjunctiva and sclera are injected.  Clear tearing from the eyes.  He is rubbing his eyes frequently during exam.  Neck:  Soft and supple, no lymphadenopathy in the anterior, posterior, supraclavicular or submandibular areas.  Lungs:  Clear to auscultation without rales, rhonchi, or wheezes.  Heart:  Regular rate and rhythm without murmur or extra sounds.  Skin:  No rashes noted.    No visits with results within 1 Day(s) from this visit.   Latest known visit with results is:   Lab Services on 10/02/2019   Component Date Value   • Hemoglobin A1C 10/02/2019 6.6*       Assessment:   1. Viral URI with cough    2. Allergic rhinitis, unspecified seasonality, unspecified trigger    3. Allergic conjunctivitis of both eyes    4. Type 2 diabetes mellitus with complication, without long-term current use of insulin (CMS/[facility])      Plan:  Patient was looking for antibiotics.  I told that this was not recommended at this point as this does look viral on top of allergic rhinitis.  I added as to the nasal spray, 2 sprays each nostril twice daily, fexofenadine 180 mg daily, and Patanol eyedrops 1 drop each eye twice daily.  Continue with his other medications.  Follow up if he is not improving within 7-10 days and sooner if he gets progressively worse.  Patient advised to quit smoking.  A new diabetic meter was ordered.  He should check his blood sugars daily.    Supervising physician, Dr. Robert Barros